# Patient Record
Sex: MALE | URBAN - METROPOLITAN AREA
[De-identification: names, ages, dates, MRNs, and addresses within clinical notes are randomized per-mention and may not be internally consistent; named-entity substitution may affect disease eponyms.]

---
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
## 2019-05-07 ENCOUNTER — OFFICE VISIT (OUTPATIENT)
Dept: URBAN - METROPOLITAN AREA CLINIC 23 | Facility: CLINIC | Age: 19
End: 2019-05-07
Payer: COMMERCIAL

## 2019-05-07 PROCEDURE — 92014 COMPRE OPH EXAM EST PT 1/>: CPT | Performed by: OPHTHALMOLOGY

## 2019-05-07 PROCEDURE — 92134 CPTRZ OPH DX IMG PST SGM RTA: CPT | Performed by: OPHTHALMOLOGY

## 2019-05-07 ASSESSMENT — INTRAOCULAR PRESSURE
OD: 16
OS: 16

## 2019-05-07 NOTE — IMPRESSION/PLAN
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 OD. Condition: stable. Pt has not had any ALMAS tx's OD or OS. Plan: Discussed diagnosis in detail with patient. Exam and OCT shows no fluid, no heme - stable OD. No treatment is required at this time based on exam and OCT. Recommend observation for now. Will reassess condition in 6 months. Gonio Exam and OCT OS shows stable, no tears or detachments. Recommend observation for now.